# Patient Record
Sex: MALE | Race: WHITE | URBAN - METROPOLITAN AREA
[De-identification: names, ages, dates, MRNs, and addresses within clinical notes are randomized per-mention and may not be internally consistent; named-entity substitution may affect disease eponyms.]

---
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
## 2025-04-23 ENCOUNTER — HOSPITAL ENCOUNTER (EMERGENCY)
Facility: HOSPITAL | Age: 20
Discharge: HOME | End: 2025-04-23
Attending: EMERGENCY MEDICINE
Payer: MEDICAID

## 2025-04-23 VITALS
SYSTOLIC BLOOD PRESSURE: 156 MMHG | HEART RATE: 104 BPM | WEIGHT: 183.38 LBS | OXYGEN SATURATION: 99 % | TEMPERATURE: 98 F | DIASTOLIC BLOOD PRESSURE: 93 MMHG | BODY MASS INDEX: 27.79 KG/M2 | HEIGHT: 68 IN | RESPIRATION RATE: 18 BRPM

## 2025-04-23 DIAGNOSIS — Z76.89 ENCOUNTER FOR INCISION AND DRAINAGE PROCEDURE: ICD-10-CM

## 2025-04-23 DIAGNOSIS — L02.91 ABSCESS: Primary | ICD-10-CM

## 2025-04-23 PROCEDURE — 10080 I&D PILONIDAL CYST SIMPLE: CPT

## 2025-04-23 PROCEDURE — 63600175 PHARM REV CODE 636 W HCPCS: Performed by: EMERGENCY MEDICINE

## 2025-04-23 PROCEDURE — 99283 EMERGENCY DEPT VISIT LOW MDM: CPT | Mod: 25

## 2025-04-23 RX ORDER — SULFAMETHOXAZOLE AND TRIMETHOPRIM 800; 160 MG/1; MG/1
1 TABLET ORAL 2 TIMES DAILY
Qty: 14 TABLET | Refills: 0 | Status: SHIPPED | OUTPATIENT
Start: 2025-04-23 | End: 2025-04-30

## 2025-04-23 RX ADMIN — LIDOCAINE HYDROCHLORIDE 20 ML: 10; .005 INJECTION, SOLUTION EPIDURAL; INFILTRATION; INTRACAUDAL; PERINEURAL at 12:04

## 2025-04-23 NOTE — ED PROVIDER NOTES
"Encounter Date: 4/23/2025       History     Chief Complaint   Patient presents with    Abscess     Here for possible abscess to left upper buttocks; states has been there for few days. No drainage. States that it did not bother him until he "hit it" and it "moved".      Patient with no known medical problems presents with mid gluteal pain and swelling for approximately 3-4 days in the absence any recent fevers illnesses or traumas.  He does state that he has been having pain there since he shaved 3-4 days ago and now it is getting worse where he hit it in hurts.  No rectal pain isolated pain to mid gluteal region on left.      Review of patient's allergies indicates:  No Known Allergies  History reviewed. No pertinent past medical history.  History reviewed. No pertinent surgical history.  No family history on file.  Social History[1]  Review of Systems   Musculoskeletal:         Left buttock pain and swelling   All other systems reviewed and are negative.      Physical Exam     Initial Vitals [04/23/25 1117]   BP Pulse Resp Temp SpO2   (!) 143/103 103 18 98.3 °F (36.8 °C) 99 %      MAP       --         Physical Exam    Nursing note and vitals reviewed.  Constitutional: He appears well-developed and well-nourished.   HENT:   Head: Normocephalic and atraumatic.   Eyes: EOM are normal. Pupils are equal, round, and reactive to light.   Neck:   Normal range of motion.  Cardiovascular:  Normal rate and regular rhythm.           Pulmonary/Chest: Breath sounds normal. No respiratory distress. He has no wheezes. He has no rales.   Abdominal: Abdomen is soft. Bowel sounds are normal. He exhibits no distension. There is no abdominal tenderness. There is no rebound.   Musculoskeletal:         General: Normal range of motion.      Cervical back: Normal range of motion.      Comments: Patient has a proximally 4 x 5 cm fluctuant area mid left gluteal pain no surrounding erythema consistent with localized abscess     Neurological: " He is alert and oriented to person, place, and time.         ED Course   I & D - Incision and Drainage    Date/Time: 4/23/2025 1:39 PM  Location procedure was performed: [facility] EMERGENCY DEPARTMENT    Performed by: Davin Lowry MD  Authorized by: Davin Lowry MD  Consent Done: Yes  Consent given by: patient  Patient understanding: patient states understanding of the procedure being performed  Patient consent: the patient's understanding of the procedure matches consent given  Patient identity confirmed: MRN  Type: abscess  Body area: lower extremity  Location details: right buttock  Anesthesia: local infiltration    Anesthesia:  Local Anesthetic: lidocaine 1% with epinephrine  Anesthetic total: 15 mL    Patient sedated: no  Scalpel size: 11  Incision type: single straight  Incision depth: dermal  Complexity: simple  Drainage: bloody and purulent  Drainage amount: moderate  Wound treatment: wound left open  Packing material: 1/4 in gauze  Estimated blood loss (mL): 5  Specimens: No  Implants: No    Incision depth: dermal        Labs Reviewed - No data to display       Imaging Results    None          Medications   LIDOcaine-EPINEPHrine (PF) 1%-1:200,000 injection 20 mL (20 mLs Intradermal Given by Provider 4/23/25 1213)     Medical Decision Making  Risk  Prescription drug management.                          Medical Decision Making:   Initial Assessment:   Patient has abscess centered on mid gluteal cleft with no abnormal findings within gluteal cleft for around anus.  Will I and D  Differential Diagnosis:   Abscess, cellulitis, pilonidal cyst, perianal abscess  ED Management:  I and D was performed approximately 10 cc of bloody purulence was expressed and loculations broken up with forceps.  Proximally 2 cm linear incision was made.  Please see procedure note Packing was placed with dressing placed afterwards by nursing staff.  Advised to have wound check up in 2 days and have packing removed.  Patient will be  placed on 5 days of Bactrim no known allergies return precautions provided             Clinical Impression:  Final diagnoses:  [L02.91] Abscess (Primary)  [Z76.89] Encounter for incision and drainage procedure                     [1]   Social History  Tobacco Use    Smoking status: Never    Smokeless tobacco: Never   Substance Use Topics    Alcohol use: Never    Drug use: Never        Davin Lowry MD  04/23/25 5782

## 2025-04-23 NOTE — DISCHARGE INSTRUCTIONS
Please follow up in 2 days for wound recheck and packing removal.  Please take all antibiotics as prescribed.

## 2025-04-25 ENCOUNTER — HOSPITAL ENCOUNTER (EMERGENCY)
Facility: HOSPITAL | Age: 20
Discharge: HOME | End: 2025-04-25
Attending: INTERNAL MEDICINE
Payer: MEDICAID

## 2025-04-25 VITALS
OXYGEN SATURATION: 97 % | BODY MASS INDEX: 27.67 KG/M2 | SYSTOLIC BLOOD PRESSURE: 141 MMHG | RESPIRATION RATE: 18 BRPM | HEART RATE: 89 BPM | DIASTOLIC BLOOD PRESSURE: 92 MMHG | WEIGHT: 182 LBS | TEMPERATURE: 98 F

## 2025-04-25 DIAGNOSIS — Z51.89 VISIT FOR WOUND CHECK: Primary | ICD-10-CM

## 2025-04-25 PROCEDURE — 99282 EMERGENCY DEPT VISIT SF MDM: CPT

## 2025-04-25 NOTE — ED PROVIDER NOTES
Encounter Date: 4/25/2025       History     Chief Complaint   Patient presents with    Wound Check     FU I&D OF ABSCESS TO LT BUTTOCKS, REPORTS IMPROVEMENT W RX MEDS.        A 19 y.o. male patient with a history of no known medical problems presents to the ED for wound recheck following I&D of left buttocks wound 2 days ago. Patient states packing was placed but it has fell out. Patient states pain is much improved and it is healing well. He is taking bactrim as prescribed. Denies fever, chills.       The history is provided by the patient.     Review of patient's allergies indicates:  No Known Allergies  History reviewed. No pertinent past medical history.  History reviewed. No pertinent surgical history.  No family history on file.  Social History[1]  Review of Systems   Constitutional:  Negative for chills and fever.   Eyes:  Negative for visual disturbance.   Respiratory:  Negative for shortness of breath.    Cardiovascular:  Negative for chest pain.   Gastrointestinal:  Negative for nausea and vomiting.   Genitourinary:  Negative for difficulty urinating and dysuria.   Musculoskeletal:  Negative for arthralgias.   Skin:  Positive for wound. Negative for color change and rash.   Neurological:  Negative for weakness and headaches.   Hematological:  Does not bruise/bleed easily.   Psychiatric/Behavioral:  Negative for confusion.    All other systems reviewed and are negative.      Physical Exam     Initial Vitals [04/25/25 1223]   BP Pulse Resp Temp SpO2   (!) 141/92 89 18 97.5 °F (36.4 °C) 97 %      MAP       --         Physical Exam    Nursing note and vitals reviewed.  Constitutional: He appears well-developed and well-nourished.   HENT:   Head: Normocephalic and atraumatic.   Right Ear: External ear normal.   Left Ear: External ear normal.   Nose: Nose normal.   Eyes: Conjunctivae and EOM are normal.   Neck: Neck supple.   Cardiovascular:  Normal rate, regular rhythm and normal heart sounds.     Exam reveals  no gallop and no friction rub.       No murmur heard.  Pulmonary/Chest: Breath sounds normal. No respiratory distress. He has no wheezes. He has no rhonchi. He has no rales.   Abdominal: He exhibits no distension.   Musculoskeletal:      Cervical back: Neck supple.     Neurological: He is alert and oriented to person, place, and time. GCS eye subscore is 4. GCS verbal subscore is 5. GCS motor subscore is 6.   Skin: Skin is warm and dry. Capillary refill takes less than 2 seconds.   Small incision to left superior buttocks with scant bloody drainage currently. Non-tender. No packing in place currently. Appears to be healing well.          ED Course   Procedures  Labs Reviewed - No data to display       Imaging Results    None          Medications - No data to display  Medical Decision Making  A 19 y.o. male patient with a history of no known medical problems presents to the ED for wound recheck following I&D of left buttocks wound 2 days ago. Patient states packing was placed but it has fell out. Patient states pain is much improved and it is healing well. He is taking bactrim as prescribed. Denies fever, chills.     Clinical impression:  Visit for wound check (Primary)    Patient is non-toxic appearing and tolerating nutritional intake. Patient's vital signs and clinical condition are stable for DC with ED Prescriptions    None        Follow-up: PCP or Internal medicine clinic within 3 days  Referrals made: none    Strict follow-up precautions given. Patient verbalizes understanding of treatment plan and ED return precautions.         Risk  Risk Details: Given strict ED return precautions. I have spoken with the patient and/or caregivers. I have explained the patient's condition, diagnoses and treatment plan based on the information available to me at this time. I have answered the patient's and/or caregiver's questions and addressed any concerns. The patient and/or caregivers have as good an understanding of the  patient's diagnosis, condition and treatment plan as can be expected at this point. The patient's condition is stable and appropriate for discharge from the emergency department.      The patient will pursue further outpatient evaluation with the primary care physician or other designated or consulting physician as outlined in the discharge instructions. The patient and/or caregivers are agreeable to this plan of care and follow-up instructions have been explained in detail. The patient and/or caregivers have received these instructions in written format and have expressed an understanding of the discharge instructions. The patient and/or caregivers are aware that any significant change in condition or worsening of symptoms should prompt an immediate return to this or the closest emergency department or a call to 911.               Additional MDM:   Differential Diagnosis:   Other: The following diagnoses were also considered and will be evaluated: medication refill, medical screening exam and medical evaluation.                                   Clinical Impression:  Final diagnoses:  [Z51.89] Visit for wound check (Primary)          ED Disposition Condition    Discharge Stable          ED Prescriptions    None       Follow-up Information       Follow up With Specialties Details Why Contact Info    [facility] - Emergency Dept Emergency Medicine Go to  If symptoms worsen, As needed [address] [facility] [address] [phone]    João Guillaume MD Pediatrics In 3 days  [address] [address]  [phone]                 [1]   Social History  Tobacco Use    Smoking status: Never    Smokeless tobacco: Never   Substance Use Topics    Alcohol use: Never    Drug use: Never        Aspen Davis PA  04/25/25 [id number]